# Patient Record
Sex: MALE | Race: WHITE | NOT HISPANIC OR LATINO | Employment: OTHER | URBAN - METROPOLITAN AREA
[De-identification: names, ages, dates, MRNs, and addresses within clinical notes are randomized per-mention and may not be internally consistent; named-entity substitution may affect disease eponyms.]

---

## 2018-04-14 LAB
ABSOL LYMPHOCYTES (HISTORICAL): 1 K/UL (ref 0.5–4)
ALBUMIN SERPL BCP-MCNC: 4.5 G/DL (ref 3–5.2)
ALP SERPL-CCNC: 65 U/L (ref 43–122)
ALT SERPL W P-5'-P-CCNC: 42 U/L (ref 9–52)
ANION GAP SERPL CALCULATED.3IONS-SCNC: 12 MMOL/L (ref 5–14)
AST SERPL W P-5'-P-CCNC: 29 U/L (ref 17–59)
BANDS (HISTORICAL): 5 % (ref 3–11)
BILIRUB SERPL-MCNC: 0.8 MG/DL
BUN SERPL-MCNC: 21 MG/DL (ref 5–25)
CALCIUM SERPL-MCNC: 9.1 MG/DL (ref 8.4–10.2)
CHLORIDE SERPL-SCNC: 100 MEQ/L (ref 97–108)
CHOLEST SERPL-MCNC: 172 MG/DL
CHOLEST/HDLC SERPL: 6.4 {RATIO}
CO2 SERPL-SCNC: 30 MMOL/L (ref 22–30)
COMMENT (HISTORICAL): ABNORMAL
CREATINE, SERUM (HISTORICAL): 0.85 MG/DL (ref 0.7–1.5)
DEPRECATED RDW RBC AUTO: 13.8 %
EGFR (HISTORICAL): >60 ML/MIN/1.73 M2
EOSINOPHIL # BLD AUTO: 0.2 K/UL (ref 0–0.4)
EOSINOPHIL NFR BLD AUTO: 5 % (ref 0–6)
GLUCOSE FASTING (HISTORICAL): 88 MG/DL (ref 70–99)
HCT VFR BLD AUTO: 44.5 % (ref 41–53)
HDLC SERPL-MCNC: 27 MG/DL
HGB BLD-MCNC: 14.9 G/DL (ref 13.5–17.5)
LDL/HDL RATIO (HISTORICAL): 4.2
LDLC SERPL CALC-MCNC: 113 MG/DL
LYMPHOCYTES NFR BLD AUTO: 25 % (ref 25–45)
MCH RBC QN AUTO: 29.3 PG (ref 26–34)
MCHC RBC AUTO-ENTMCNC: 33.4 % (ref 31–36)
MCV RBC AUTO: 88 FL (ref 80–100)
MONOCYTES # BLD AUTO: 0.5 K/UL (ref 0.2–0.9)
MONOCYTES NFR BLD AUTO: 13 % (ref 1–10)
NEUTROPHILS ABS COUNT (HISTORICAL): 2.1 K/UL (ref 1.8–7.8)
NEUTS SEG NFR BLD AUTO: 52 % (ref 45–65)
PLATELET # BLD AUTO: 194 K/MCL (ref 150–450)
POTASSIUM SERPL-SCNC: 4.2 MEQ/L (ref 3.6–5)
PSA (HISTORICAL): 0.45 NG/ML
RBC # BLD AUTO: 5.07 M/MCL (ref 4.5–5.9)
RBC MORPHOLOGY (HISTORICAL): ABNORMAL
SODIUM SERPL-SCNC: 142 MEQ/L (ref 137–147)
TOTAL PROTEIN (HISTORICAL): 6.9 G/DL (ref 5.9–8.4)
TRIGL SERPL-MCNC: 159 MG/DL
TSH SERPL DL<=0.05 MIU/L-ACNC: 0.83 UIU/ML (ref 0.47–4.68)
VLDLC SERPL CALC-MCNC: 32 MG/DL (ref 0–40)
WBC # BLD AUTO: 3.8 K/MCL (ref 4.5–11)

## 2018-04-15 LAB
EST. AVERAGE GLUCOSE BLD GHB EST-MCNC: 126 MG/DL
HBA1C MFR BLD HPLC: 6 %

## 2018-04-16 LAB — HEPATITIS C ANTIBODY (HISTORICAL): NORMAL

## 2018-09-19 ENCOUNTER — EVALUATION (OUTPATIENT)
Dept: PHYSICAL THERAPY | Facility: REHABILITATION | Age: 68
End: 2018-09-19
Payer: COMMERCIAL

## 2018-09-19 DIAGNOSIS — Z96.651 AFTERCARE FOLLOWING RIGHT KNEE JOINT REPLACEMENT SURGERY: Primary | ICD-10-CM

## 2018-09-19 DIAGNOSIS — Z47.1 AFTERCARE FOLLOWING RIGHT KNEE JOINT REPLACEMENT SURGERY: Primary | ICD-10-CM

## 2018-09-19 PROCEDURE — G8978 MOBILITY CURRENT STATUS: HCPCS | Performed by: PHYSICAL THERAPIST

## 2018-09-19 PROCEDURE — 97110 THERAPEUTIC EXERCISES: CPT | Performed by: PHYSICAL THERAPIST

## 2018-09-19 PROCEDURE — 97140 MANUAL THERAPY 1/> REGIONS: CPT | Performed by: PHYSICAL THERAPIST

## 2018-09-19 PROCEDURE — 97161 PT EVAL LOW COMPLEX 20 MIN: CPT | Performed by: PHYSICAL THERAPIST

## 2018-09-19 PROCEDURE — G8979 MOBILITY GOAL STATUS: HCPCS | Performed by: PHYSICAL THERAPIST

## 2018-09-19 NOTE — PROGRESS NOTES
PT Evaluation     Today's date: 2018  Patient name: Vicenta Holloway  : 1950  MRN: [id number]  Referring provider: Ella Almanzar MD  Dx: No diagnosis found  Assessment  Impairments: abnormal gait, abnormal muscle firing, abnormal or restricted ROM, abnormal movement, activity intolerance, impaired balance, impaired physical strength, lacks appropriate home exercise program and pain with function    Assessment details: Vicenta Holloway is a 79 y o  male who is S/P R TKA   The pain is in the anterior knee  The symptoms are now improved  There is moderate weakness  PT eval revealed the impairments listed above  Functional outcome measures are WNL and pt is not at risk for falls  Pt was advised to wean from the cane while at home and for short distances and public walking  Pt was given a comprehensive HEP, pt verbalized and demonstrated understanding  Pt was additionally instructed to start desensitization areas around the scar and increasing time spent elevating the affect LE to decrease edema  Pt verbalized understanding  Prior to eval pt ambulated with R crouched gait, with extensive manual work pt was able to ambulate with decreased flexion and improved quality of heel toe gait  Pt has activity limitations in walking, lifting, and carrying objects which have ultimately lead to participation restrictions in household work, recreational activities, and work  Pt would benefit from skilled outpatient PT to address pain, gait, strength, edema, and muscular endurance in order to maximize his level of function  Understanding of Dx/Px/POC: good   Prognosis: good    Goals  ST  Independent with HEP in 2 weeks  2  Pt will have verbal report of improvement in symptoms by >/=25% in 2 weeks    3  Improve R  Knee flexion by >/= 7 degrees in 2 weeks  4  Pt will ambulate with no AD with minimal gait deficits in 2 weeks  5   Pt will improve knee extension to 0 deg in 2 weeks     LT  Pt will improve FOTO score by >/= 6 points in 6 weeks  2  Pt will improve FOTO score to >/=68 by visit # 14  3  Pt will be able to walk >/= 2 blocks with  no difficulty in 6 weeks  4  Pt will be able to mow his lawn independently with no difficulty in 6 weeks  5  Pt will be able to walk >/= 1 mile with no difficulty in 6 weeks  6  Pt will be able to perform house hold work with little to no difficulty in 6 weeks      Plan  Patient would benefit from: skilled physical therapy  Planned modality interventions: cryotherapy, thermotherapy: hydrocollator packs and TENS  Planned therapy interventions: balance, joint mobilization, manual therapy, massage, muscle pump exercises, neuromuscular re-education, patient education, strengthening, stretching, therapeutic exercise, therapeutic activities, gait training, home exercise program and flexibility  Frequency: 2x week  Duration in weeks: 6  Plan of Care beginning date: 2018  Treatment plan discussed with: patient        Subjective Evaluation    History of Present Illness  Date of surgery: 2018  Mechanism of injury: surgery  Mechanism of injury: Pt underwent a TKA on 18  Pt reports his first week he had a ton of pain, which has subsided a bit  He has been trying to walk without the cane  For his job he needs to carry a 100 lbs of metal  He states his goals are "just to function"  He feels his leg is pretty good  Reviewed medications as listed below     amLODIPine-benazepril 10-40 mg per capsule   Commonly known as: LOTREL   Take 1 capsule by mouth daily  hydroCHLOROthiazide 25 MG tablet   Commonly known as: HYDRODIURIL   Take 1 tablet (25 mg total) by mouth daily       labetalol 300 MG tablet   Commonly known as: NORMODYNE   TAKE 1 TABLET BY MOUTH TWICE DAILY     multivitamin Tab   Generic drug: multivitamin     Pain  Current pain ratin  At best pain ratin  At worst pain ratin  Quality: dull ache  Relieving factors: ice  Aggravating factors: standing and walking    Social Support  Steps to enter house: yes  6  Stairs in house: yes   6  Lives in: multiple-level home    Treatments  Previous treatment: physical therapy  Patient Goals  Patient goals for therapy: decreased pain, decreased edema, improved balance, increased motion, increased strength and independence with ADLs/IADLs  Patient goal: mow the lawn with no difficulty         Objective     Observations     Right Knee   Positive for edema and incision  Negative for drainage  Palpation     Right   No palpable tenderness to the lateral gastrocnemius, medial gastrocnemius, rectus femoris, vastus lateralis and vastus medialis  Active Range of Motion   Left Knee   Flexion: 115 degrees   Extension: -5 degrees     Right Knee   Flexion: 98 degrees   Extension: -15 degrees     Passive Range of Motion     Right Knee   Extension: -10 degrees     Mobility   Patellar Mobility:     Right Knee   WFL: medial and lateral  Hypermobile: superior and inferior   Tibiofemoral Mobility:   Right knee Hypermobile in the anterior tibiofemoral tendon(s)      Strength/Myotome Testing     Lumbar   Left   Normal strength    Right Hip   Planes of Motion   Flexion: 4+  Extension: 4  Abduction: 4 (relied on hip flexion subsitution )    Left Knee   Normal strength  Quadriceps contraction: good    Right Knee   Flexion: 4+ (pain )  Extension: 4+ (pain)  Quadriceps contraction: fair    Right Ankle/Foot   Dorsiflexion: 5  Inversion: 4+  Eversion: 4+    Swelling     Left Knee Girth Measurement (cm)   Joint line: 39 5 cm  10 cm above joint line: 44 5 cm  10 cm below joint line: 38 cm    Right Knee Girth Measurement (cm)   Joint line: 42 5 cm  10 cm above joint line: 46 5 cm  10 cm below joint line: 41 cm    Ambulation   Weight-Bearing Status   Weight-Bearing Status (Left): full weight bearing   Weight-Bearing Status (Right): full weight-bearing    Assistive device used: single point cane    Ambulation: Level Surfaces   Ambulation with assistive device: independent  Ambulation without assistive device: independent    Observational Gait   Gait: antalgic, asymmetric and crouched   Right step length within functional limits  Increased left stance time and right swing time  Decreased right stance time, left swing time and left step length  Right foot contact pattern: foot flat    Quality of Movement During Gait     Knee    Knee (Right): Positive increased flexion during stance       General Comments     Knee Comments  5TSTS : 8 secs    TU secs               Precautions: HTN, MI     Manuals                     Retrograde massage in prone for increased ext  x                    Patellar mobs x                    Post capsule str x                    ER knee mobs for ext x            Flex end range str x            Hamstring str              gastroc str                        Exercise Diary                         Bike   x10 min                                            Heel slides                       Supine Hamstring stretch                      Calf stretch with strap                         SLR flex,abd, ext                       Clamshells              Bridges             Squats                         single leg squat to bosu                        Lateral slides with TB                                                                                                                                                                       Modalities                                                                                             X= performed

## 2018-09-19 NOTE — LETTER
2018    MD Joseph Betts [address] [facility]    Patient: Siva Tobias   YOB: 1950   Date of Visit: 2018     Encounter Diagnosis     ICD-10-CM    1  Aftercare following right knee joint replacement surgery Z47 1     [id number]        Dear Dr Ramirez Handler:    Please review the attached Plan of Care from Bartolo Morin recent visit  Please verify that you agree therapy should continue by signing the attached document and sending it back to our office  If you have any questions or concerns, please don't hesitate to call  Sincerely,    Nancy Carlisle, PT      Referring Provider:      I certify that I have read the below Plan of Care and certify the need for these services furnished under this plan of treatment while under my care  MD Joseph Betts [address]: [phone]          PT Evaluation     Today's date: 2018  Patient name: Siva Tobias  : 1950  MRN: [id number]  Referring provider: Luis Butler MD  Dx: No diagnosis found  Assessment  Impairments: abnormal gait, abnormal muscle firing, abnormal or restricted ROM, abnormal movement, activity intolerance, impaired balance, impaired physical strength, lacks appropriate home exercise program and pain with function    Assessment details: Siva Tobias is a 79 y o  male who is S/P R TKA   The pain is in the anterior knee  The symptoms are now improved  There is moderate weakness  PT eval revealed the impairments listed above  Functional outcome measures are WNL and pt is not at risk for falls  Pt was advised to wean from the cane while at home and for short distances and public walking  Pt was given a comprehensive HEP, pt verbalized and demonstrated understanding   Pt was additionally instructed to start desensitization areas around the scar and increasing time spent elevating the affect LE to decrease edema  Pt verbalized understanding  Prior to eval pt ambulated with R crouched gait, with extensive manual work pt was able to ambulate with decreased flexion and improved quality of heel toe gait  Pt has activity limitations in walking, lifting, and carrying objects which have ultimately lead to participation restrictions in household work, recreational activities, and work  Pt would benefit from skilled outpatient PT to address pain, gait, strength, edema, and muscular endurance in order to maximize his level of function  Understanding of Dx/Px/POC: good   Prognosis: good    Goals  ST  Independent with HEP in 2 weeks  2  Pt will have verbal report of improvement in symptoms by >/=25% in 2 weeks    3  Improve R  Knee flexion by >/= 7 degrees in 2 weeks  4  Pt will ambulate with no AD with minimal gait deficits in 2 weeks  5  Pt will improve knee extension to 0 deg in 2 weeks     LT  Pt will improve FOTO score by >/= 6 points in 6 weeks  2  Pt will improve FOTO score to >/=68 by visit # 14  3  Pt will be able to walk >/= 2 blocks with  no difficulty in 6 weeks  4  Pt will be able to mow his lawn independently with no difficulty in 6 weeks  5  Pt will be able to walk >/= 1 mile with no difficulty in 6 weeks  6   Pt will be able to perform house hold work with little to no difficulty in 6 weeks      Plan  Patient would benefit from: skilled physical therapy  Planned modality interventions: cryotherapy, thermotherapy: hydrocollator packs and TENS  Planned therapy interventions: balance, joint mobilization, manual therapy, massage, muscle pump exercises, neuromuscular re-education, patient education, strengthening, stretching, therapeutic exercise, therapeutic activities, gait training, home exercise program and flexibility  Frequency: 2x week  Duration in weeks: 6  Plan of Care beginning date: 2018  Treatment plan discussed with: patient        Subjective Evaluation    History of Present Illness  Date of surgery: 2018  Mechanism of injury: surgery  Mechanism of injury: Pt underwent a TKA on 18  Pt reports his first week he had a ton of pain, which has subsided a bit  He has been trying to walk without the cane  For his job he needs to carry a 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 of metal  He states his goals are "just to function"  He feels his leg is pretty good  Reviewed medications as listed below     amLODIPine-benazepril 10-40 mg per capsule   Commonly known as: LOTREL   Take 1 capsule by mouth daily  hydroCHLOROthiazide 25 MG tablet   Commonly known as: HYDRODIURIL   Take 1 tablet (25 mg total) by mouth daily  labetalol 300 MG tablet   Commonly known as: NORMODYNE   TAKE 1 TABLET BY MOUTH TWICE DAILY     multivitamin Tab   Generic drug: multivitamin     Pain  Current pain ratin  At best pain ratin  At worst pain ratin  Quality: dull ache  Relieving factors: ice  Aggravating factors: standing and walking    Social Support  Steps to enter house: yes  6  Stairs in house: yes   6  Lives in: multiple-level home    Treatments  Previous treatment: physical therapy  Patient Goals  Patient goals for therapy: decreased pain, decreased edema, improved balance, increased motion, increased strength and independence with ADLs/IADLs  Patient goal: mow the lawn with no difficulty         Objective     Observations     Right Knee   Positive for edema and incision  Negative for drainage  Palpation     Right   No palpable tenderness to the lateral gastrocnemius, medial gastrocnemius, rectus femoris, vastus lateralis and vastus medialis       Active Range of Motion   Left Knee   Flexion: 115 degrees   Extension: -5 degrees     Right Knee   Flexion: 98 degrees   Extension: -15 degrees     Passive Range of Motion     Right Knee   Extension: -10 degrees     Mobility   Patellar Mobility:     Right Knee   WFL: medial and lateral  Hypermobile: superior and inferior   Tibiofemoral Mobility:   Right knee Hypermobile in the anterior tibiofemoral tendon(s)  Strength/Myotome Testing     Lumbar   Left   Normal strength    Right Hip   Planes of Motion   Flexion: 4+  Extension: 4  Abduction: 4 (relied on hip flexion subsitution )    Left Knee   Normal strength  Quadriceps contraction: good    Right Knee   Flexion: 4+ (pain )  Extension: 4+ (pain)  Quadriceps contraction: fair    Right Ankle/Foot   Dorsiflexion: 5  Inversion: 4+  Eversion: 4+    Swelling     Left Knee Girth Measurement (cm)   Joint line: 39 5 cm  10 cm above joint line: 44 5 cm  10 cm below joint line: 38 cm    Right Knee Girth Measurement (cm)   Joint line: 42 5 cm  10 cm above joint line: 46 5 cm  10 cm below joint line: 41 cm    Ambulation   Weight-Bearing Status   Weight-Bearing Status (Left): full weight bearing   Weight-Bearing Status (Right): full weight-bearing    Assistive device used: single point cane    Ambulation: Level Surfaces   Ambulation with assistive device: independent  Ambulation without assistive device: independent    Observational Gait   Gait: antalgic, asymmetric and crouched   Right step length within functional limits  Increased left stance time and right swing time  Decreased right stance time, left swing time and left step length  Right foot contact pattern: foot flat    Quality of Movement During Gait     Knee    Knee (Right): Positive increased flexion during stance       General Comments     Knee Comments  5TSTS : 8 secs    TU secs               Precautions: HTN, MI     Manuals                     Retrograde massage in prone for increased ext  x                    Patellar mobs x                    Post capsule str x                    ER knee mobs for ext x            Flex end range str x            Hamstring str              gastroc str                        Exercise Diary                         Bike   x10 min                                            Heel slides                       Supine Hamstring stretch                      Calf stretch with strap                         SLR flex,abd, ext                       Clamshells              Bridges             Squats                         single leg squat to bosu                        Lateral slides with TB                                                                                                                                                                       Modalities                                                                                             X= performed

## 2018-09-21 ENCOUNTER — APPOINTMENT (OUTPATIENT)
Dept: PHYSICAL THERAPY | Facility: REHABILITATION | Age: 68
End: 2018-09-21
Payer: COMMERCIAL

## 2018-09-24 ENCOUNTER — TRANSCRIBE ORDERS (OUTPATIENT)
Dept: PHYSICAL THERAPY | Facility: REHABILITATION | Age: 68
End: 2018-09-24

## 2018-09-24 DIAGNOSIS — Z96.651 AFTERCARE FOLLOWING RIGHT KNEE JOINT REPLACEMENT SURGERY: Primary | ICD-10-CM

## 2018-09-24 DIAGNOSIS — Z47.1 AFTERCARE FOLLOWING RIGHT KNEE JOINT REPLACEMENT SURGERY: Primary | ICD-10-CM

## 2018-09-25 ENCOUNTER — APPOINTMENT (OUTPATIENT)
Dept: PHYSICAL THERAPY | Facility: REHABILITATION | Age: 68
End: 2018-09-25
Payer: COMMERCIAL

## 2018-09-27 ENCOUNTER — APPOINTMENT (OUTPATIENT)
Dept: PHYSICAL THERAPY | Facility: REHABILITATION | Age: 68
End: 2018-09-27
Payer: COMMERCIAL

## 2018-10-29 NOTE — PROGRESS NOTES
PT Discharge    Today's date: 10/29/2018  Patient name: Padmaja Cerda  : 1950  MRN: [id number]  Referring provider: Alta Lopez MD  Dx:   Encounter Diagnosis     ICD-10-CM    1  Aftercare following right knee joint replacement surgery Z47 1     [id number] [id number]        Start Time: 1030  Stop Time: 1130  Total time in clinic (min): 60 minutes    Assessment/Plan Pt was on hold for PT due to infection  Once infection was cleared PT did not make follow up appointments  Attempts have been made to reschedule, and still have not shown up for reschedule  At this point pt has self d/c to home to continue exercises at home         Subjective    Objective    Flowsheet Rows      Most Recent Value   PT/OT G-Codes   Current Score  51   Projected Score  68   FOTO information reviewed  Yes   Assessment Type  Evaluation   G code set  Mobility: Walking & Moving Around   Mobility: Walking and Moving Around Current Status ()  CK   Mobility: Walking and Moving Around Goal Status ()  CJ

## 2019-05-10 ENCOUNTER — TRANSCRIBE ORDERS (OUTPATIENT)
Dept: ADMINISTRATIVE | Facility: HOSPITAL | Age: 69
End: 2019-05-10

## 2019-05-10 ENCOUNTER — APPOINTMENT (OUTPATIENT)
Dept: LAB | Facility: IMAGING CENTER | Age: 69
End: 2019-05-10
Payer: COMMERCIAL

## 2019-05-10 DIAGNOSIS — I25.10 ATHEROSCLEROSIS OF NATIVE CORONARY ARTERY WITHOUT ANGINA PECTORIS, UNSPECIFIED WHETHER NATIVE OR TRANSPLANTED HEART: ICD-10-CM

## 2019-05-10 DIAGNOSIS — I10 ESSENTIAL HYPERTENSION, MALIGNANT: ICD-10-CM

## 2019-05-10 DIAGNOSIS — E78.49 OTHER HYPERLIPIDEMIA: Primary | ICD-10-CM

## 2019-05-10 DIAGNOSIS — E78.49 OTHER HYPERLIPIDEMIA: ICD-10-CM

## 2019-05-10 DIAGNOSIS — I73.9 PERIPHERAL VASCULAR DISEASE, UNSPECIFIED (HCC): ICD-10-CM

## 2019-05-10 DIAGNOSIS — R73.9 BLOOD GLUCOSE ELEVATED: ICD-10-CM

## 2019-05-10 LAB
ALBUMIN SERPL BCP-MCNC: 3.9 G/DL (ref 3.5–5)
ALP SERPL-CCNC: 77 U/L (ref 46–116)
ALT SERPL W P-5'-P-CCNC: 45 U/L (ref 12–78)
ANION GAP SERPL CALCULATED.3IONS-SCNC: 6 MMOL/L (ref 4–13)
AST SERPL W P-5'-P-CCNC: 23 U/L (ref 5–45)
BILIRUB SERPL-MCNC: 0.64 MG/DL (ref 0.2–1)
BUN SERPL-MCNC: 14 MG/DL (ref 5–25)
CALCIUM SERPL-MCNC: 8.4 MG/DL (ref 8.3–10.1)
CHLORIDE SERPL-SCNC: 105 MMOL/L (ref 100–108)
CHOLEST SERPL-MCNC: 184 MG/DL (ref 50–200)
CO2 SERPL-SCNC: 28 MMOL/L (ref 21–32)
CREAT SERPL-MCNC: 0.92 MG/DL (ref 0.6–1.3)
EST. AVERAGE GLUCOSE BLD GHB EST-MCNC: 117 MG/DL
GFR SERPL CREATININE-BSD FRML MDRD: 85 ML/MIN/1.73SQ M
GLUCOSE P FAST SERPL-MCNC: 107 MG/DL (ref 65–99)
HBA1C MFR BLD: 5.7 % (ref 4.2–6.3)
HDLC SERPL-MCNC: 29 MG/DL (ref 40–60)
LDLC SERPL CALC-MCNC: 120 MG/DL (ref 0–100)
NONHDLC SERPL-MCNC: 155 MG/DL
POTASSIUM SERPL-SCNC: 3.8 MMOL/L (ref 3.5–5.3)
PROT SERPL-MCNC: 7.1 G/DL (ref 6.4–8.2)
SODIUM SERPL-SCNC: 139 MMOL/L (ref 136–145)
TRIGL SERPL-MCNC: 176 MG/DL

## 2019-05-10 PROCEDURE — 80061 LIPID PANEL: CPT

## 2019-05-10 PROCEDURE — 80053 COMPREHEN METABOLIC PANEL: CPT

## 2019-05-10 PROCEDURE — 36415 COLL VENOUS BLD VENIPUNCTURE: CPT

## 2019-05-10 PROCEDURE — 83036 HEMOGLOBIN GLYCOSYLATED A1C: CPT

## 2020-07-22 ENCOUNTER — APPOINTMENT (OUTPATIENT)
Dept: LAB | Facility: IMAGING CENTER | Age: 70
End: 2020-07-22
Payer: COMMERCIAL

## 2020-07-22 ENCOUNTER — TRANSCRIBE ORDERS (OUTPATIENT)
Dept: ADMINISTRATIVE | Facility: HOSPITAL | Age: 70
End: 2020-07-22

## 2020-07-22 DIAGNOSIS — E78.5 HYPERLIPIDEMIA, UNSPECIFIED HYPERLIPIDEMIA TYPE: ICD-10-CM

## 2020-07-22 DIAGNOSIS — G47.33 OBSTRUCTIVE SLEEP APNEA (ADULT) (PEDIATRIC): ICD-10-CM

## 2020-07-22 DIAGNOSIS — I73.9 PERIPHERAL VASCULAR DISEASE, UNSPECIFIED (HCC): ICD-10-CM

## 2020-07-22 DIAGNOSIS — I10 ESSENTIAL HYPERTENSION, MALIGNANT: ICD-10-CM

## 2020-07-22 DIAGNOSIS — C20 MALIGNANT NEOPLASM OF RECTUM (HCC): ICD-10-CM

## 2020-07-22 DIAGNOSIS — E79.0 URICACIDEMIA: ICD-10-CM

## 2020-07-22 DIAGNOSIS — R73.9 BLOOD GLUCOSE ELEVATED: ICD-10-CM

## 2020-07-22 DIAGNOSIS — C20 MALIGNANT NEOPLASM OF RECTUM (HCC): Primary | ICD-10-CM

## 2020-07-22 DIAGNOSIS — E78.5 HYPERLIPIDEMIA, UNSPECIFIED HYPERLIPIDEMIA TYPE: Primary | ICD-10-CM

## 2020-07-22 DIAGNOSIS — I25.10 ATHEROSCLEROSIS OF NATIVE CORONARY ARTERY OF NATIVE HEART WITHOUT ANGINA PECTORIS: ICD-10-CM

## 2020-07-22 LAB
ALBUMIN SERPL BCP-MCNC: 3.9 G/DL (ref 3.5–5)
ALP SERPL-CCNC: 71 U/L (ref 46–116)
ALT SERPL W P-5'-P-CCNC: 52 U/L (ref 12–78)
ANION GAP SERPL CALCULATED.3IONS-SCNC: 6 MMOL/L (ref 4–13)
AST SERPL W P-5'-P-CCNC: 27 U/L (ref 5–45)
BASOPHILS # BLD AUTO: 0.03 THOUSANDS/ΜL (ref 0–0.1)
BASOPHILS NFR BLD AUTO: 1 % (ref 0–1)
BILIRUB SERPL-MCNC: 0.62 MG/DL (ref 0.2–1)
BUN SERPL-MCNC: 23 MG/DL (ref 5–25)
CALCIUM SERPL-MCNC: 9.8 MG/DL (ref 8.3–10.1)
CEA SERPL-MCNC: 0.9 NG/ML (ref 0–3)
CHLORIDE SERPL-SCNC: 105 MMOL/L (ref 100–108)
CHOLEST SERPL-MCNC: 209 MG/DL (ref 50–200)
CO2 SERPL-SCNC: 28 MMOL/L (ref 21–32)
CREAT SERPL-MCNC: 1.08 MG/DL (ref 0.6–1.3)
EOSINOPHIL # BLD AUTO: 0.22 THOUSAND/ΜL (ref 0–0.61)
EOSINOPHIL NFR BLD AUTO: 4 % (ref 0–6)
ERYTHROCYTE [DISTWIDTH] IN BLOOD BY AUTOMATED COUNT: 14 % (ref 11.6–15.1)
EST. AVERAGE GLUCOSE BLD GHB EST-MCNC: 123 MG/DL
GFR SERPL CREATININE-BSD FRML MDRD: 70 ML/MIN/1.73SQ M
GLUCOSE P FAST SERPL-MCNC: 105 MG/DL (ref 65–99)
HBA1C MFR BLD: 5.9 %
HCT VFR BLD AUTO: 43.7 % (ref 36.5–49.3)
HDLC SERPL-MCNC: 28 MG/DL
HGB BLD-MCNC: 14.7 G/DL (ref 12–17)
IMM GRANULOCYTES # BLD AUTO: 0.01 THOUSAND/UL (ref 0–0.2)
IMM GRANULOCYTES NFR BLD AUTO: 0 % (ref 0–2)
LDLC SERPL CALC-MCNC: 140 MG/DL (ref 0–100)
LYMPHOCYTES # BLD AUTO: 1.22 THOUSANDS/ΜL (ref 0.6–4.47)
LYMPHOCYTES NFR BLD AUTO: 23 % (ref 14–44)
MCH RBC QN AUTO: 29.8 PG (ref 26.8–34.3)
MCHC RBC AUTO-ENTMCNC: 33.6 G/DL (ref 31.4–37.4)
MCV RBC AUTO: 89 FL (ref 82–98)
MONOCYTES # BLD AUTO: 0.64 THOUSAND/ΜL (ref 0.17–1.22)
MONOCYTES NFR BLD AUTO: 12 % (ref 4–12)
NEUTROPHILS # BLD AUTO: 3.24 THOUSANDS/ΜL (ref 1.85–7.62)
NEUTS SEG NFR BLD AUTO: 60 % (ref 43–75)
NONHDLC SERPL-MCNC: 181 MG/DL
NRBC BLD AUTO-RTO: 0 /100 WBCS
PLATELET # BLD AUTO: 285 THOUSANDS/UL (ref 149–390)
PMV BLD AUTO: 9.8 FL (ref 8.9–12.7)
POTASSIUM SERPL-SCNC: 3.8 MMOL/L (ref 3.5–5.3)
PROT SERPL-MCNC: 7.6 G/DL (ref 6.4–8.2)
PSA SERPL-MCNC: 0.6 NG/ML (ref 0–4)
RBC # BLD AUTO: 4.94 MILLION/UL (ref 3.88–5.62)
SODIUM SERPL-SCNC: 139 MMOL/L (ref 136–145)
TRIGL SERPL-MCNC: 206 MG/DL
URATE SERPL-MCNC: 10.3 MG/DL (ref 4.2–8)
WBC # BLD AUTO: 5.36 THOUSAND/UL (ref 4.31–10.16)

## 2020-07-22 PROCEDURE — 80061 LIPID PANEL: CPT

## 2020-07-22 PROCEDURE — 84550 ASSAY OF BLOOD/URIC ACID: CPT

## 2020-07-22 PROCEDURE — 83036 HEMOGLOBIN GLYCOSYLATED A1C: CPT

## 2020-07-22 PROCEDURE — 36415 COLL VENOUS BLD VENIPUNCTURE: CPT

## 2020-07-22 PROCEDURE — 85025 COMPLETE CBC W/AUTO DIFF WBC: CPT

## 2020-07-22 PROCEDURE — 82378 CARCINOEMBRYONIC ANTIGEN: CPT

## 2020-07-22 PROCEDURE — 80053 COMPREHEN METABOLIC PANEL: CPT

## 2020-07-22 PROCEDURE — G0103 PSA SCREENING: HCPCS
